# Patient Record
Sex: MALE | Race: WHITE | Employment: FULL TIME | URBAN - METROPOLITAN AREA
[De-identification: names, ages, dates, MRNs, and addresses within clinical notes are randomized per-mention and may not be internally consistent; named-entity substitution may affect disease eponyms.]

---

## 2017-03-03 ENCOUNTER — OFFICE VISIT (OUTPATIENT)
Dept: FAMILY MEDICINE CLINIC | Age: 42
End: 2017-03-03

## 2017-03-03 VITALS
HEART RATE: 106 BPM | BODY MASS INDEX: 35.54 KG/M2 | WEIGHT: 276.8 LBS | DIASTOLIC BLOOD PRESSURE: 80 MMHG | SYSTOLIC BLOOD PRESSURE: 140 MMHG | OXYGEN SATURATION: 95 % | TEMPERATURE: 96.3 F

## 2017-03-03 DIAGNOSIS — F17.200 TOBACCO USE DISORDER: ICD-10-CM

## 2017-03-03 DIAGNOSIS — E66.09 NON MORBID OBESITY DUE TO EXCESS CALORIES: Primary | ICD-10-CM

## 2017-03-03 PROCEDURE — 99213 OFFICE O/P EST LOW 20 MIN: CPT | Performed by: FAMILY MEDICINE

## 2017-03-03 RX ORDER — PHENTERMINE HYDROCHLORIDE 37.5 MG/1
37.5 CAPSULE ORAL EVERY MORNING
Qty: 30 CAPSULE | Refills: 0 | Status: SHIPPED | OUTPATIENT
Start: 2017-03-03 | End: 2017-04-02

## 2017-05-05 ENCOUNTER — OFFICE VISIT (OUTPATIENT)
Dept: FAMILY MEDICINE CLINIC | Age: 42
End: 2017-05-05

## 2017-05-05 VITALS
SYSTOLIC BLOOD PRESSURE: 138 MMHG | WEIGHT: 265 LBS | BODY MASS INDEX: 34.02 KG/M2 | TEMPERATURE: 97.3 F | DIASTOLIC BLOOD PRESSURE: 76 MMHG | HEART RATE: 108 BPM | OXYGEN SATURATION: 97 %

## 2017-05-05 DIAGNOSIS — E66.09 NON MORBID OBESITY DUE TO EXCESS CALORIES: Primary | ICD-10-CM

## 2017-05-05 PROCEDURE — 99213 OFFICE O/P EST LOW 20 MIN: CPT | Performed by: FAMILY MEDICINE

## 2017-05-05 RX ORDER — PHENTERMINE HYDROCHLORIDE 37.5 MG/1
CAPSULE ORAL
Refills: 0 | COMMUNITY
Start: 2017-04-18 | End: 2017-05-05 | Stop reason: SDUPTHER

## 2017-05-05 RX ORDER — PHENTERMINE HYDROCHLORIDE 37.5 MG/1
CAPSULE ORAL
Qty: 30 CAPSULE | Refills: 0 | Status: SHIPPED | OUTPATIENT
Start: 2017-05-05 | End: 2017-06-15 | Stop reason: SDUPTHER

## 2017-06-15 ENCOUNTER — OFFICE VISIT (OUTPATIENT)
Dept: FAMILY MEDICINE CLINIC | Age: 42
End: 2017-06-15

## 2017-06-15 VITALS
DIASTOLIC BLOOD PRESSURE: 78 MMHG | TEMPERATURE: 97.9 F | WEIGHT: 245.6 LBS | HEART RATE: 106 BPM | BODY MASS INDEX: 31.53 KG/M2 | SYSTOLIC BLOOD PRESSURE: 138 MMHG | OXYGEN SATURATION: 98 %

## 2017-06-15 DIAGNOSIS — E66.09 NON MORBID OBESITY DUE TO EXCESS CALORIES: Primary | ICD-10-CM

## 2017-06-15 DIAGNOSIS — F41.1 GENERALIZED ANXIETY DISORDER: ICD-10-CM

## 2017-06-15 PROCEDURE — 99214 OFFICE O/P EST MOD 30 MIN: CPT | Performed by: FAMILY MEDICINE

## 2017-06-15 RX ORDER — PHENTERMINE HYDROCHLORIDE 37.5 MG/1
CAPSULE ORAL
Qty: 30 CAPSULE | Refills: 0 | Status: SHIPPED | OUTPATIENT
Start: 2017-06-15 | End: 2017-12-17 | Stop reason: ALTCHOICE

## 2017-06-15 RX ORDER — LORAZEPAM 0.5 MG/1
0.5 TABLET ORAL EVERY 6 HOURS PRN
Qty: 30 TABLET | Refills: 0 | Status: SHIPPED | OUTPATIENT
Start: 2017-06-15 | End: 2017-06-22 | Stop reason: DRUGHIGH

## 2017-06-21 ENCOUNTER — TELEPHONE (OUTPATIENT)
Dept: FAMILY MEDICINE CLINIC | Age: 42
End: 2017-06-21

## 2017-06-22 DIAGNOSIS — E66.09 NON MORBID OBESITY DUE TO EXCESS CALORIES: ICD-10-CM

## 2017-06-22 DIAGNOSIS — F41.1 GENERALIZED ANXIETY DISORDER: ICD-10-CM

## 2017-06-22 RX ORDER — LORAZEPAM 1 MG/1
1 TABLET ORAL EVERY 6 HOURS PRN
Qty: 60 TABLET | Refills: 0 | Status: SHIPPED | OUTPATIENT
Start: 2017-06-22

## 2017-06-30 ENCOUNTER — TELEPHONE (OUTPATIENT)
Dept: FAMILY MEDICINE CLINIC | Age: 42
End: 2017-06-30

## 2019-05-13 ENCOUNTER — HOSPITAL ENCOUNTER (EMERGENCY)
Age: 44
Discharge: HOME | End: 2019-05-13
Attending: EMERGENCY MEDICINE

## 2019-05-13 ENCOUNTER — APPOINTMENT (OUTPATIENT)
Dept: GENERAL RADIOLOGY | Age: 44
End: 2019-05-13

## 2019-05-13 VITALS
DIASTOLIC BLOOD PRESSURE: 89 MMHG | RESPIRATION RATE: 20 BRPM | WEIGHT: 275 LBS | HEART RATE: 88 BPM | HEIGHT: 74 IN | OXYGEN SATURATION: 95 % | TEMPERATURE: 98 F | SYSTOLIC BLOOD PRESSURE: 153 MMHG | BODY MASS INDEX: 35.29 KG/M2

## 2019-05-13 DIAGNOSIS — R07.89 CHEST WALL PAIN: ICD-10-CM

## 2019-05-13 DIAGNOSIS — J40 BRONCHITIS: Primary | ICD-10-CM

## 2019-05-13 PROCEDURE — 99284 EMERGENCY DEPT VISIT MOD MDM: CPT

## 2019-05-13 PROCEDURE — 71046 X-RAY EXAM CHEST 2 VIEWS: CPT

## 2019-05-13 PROCEDURE — 6370000000 HC RX 637 (ALT 250 FOR IP): Performed by: EMERGENCY MEDICINE

## 2019-05-13 RX ORDER — OXYCODONE HYDROCHLORIDE AND ACETAMINOPHEN 5; 325 MG/1; MG/1
1 TABLET ORAL ONCE
Status: COMPLETED | OUTPATIENT
Start: 2019-05-13 | End: 2019-05-13

## 2019-05-13 RX ORDER — IBUPROFEN 600 MG/1
600 TABLET ORAL ONCE
Status: COMPLETED | OUTPATIENT
Start: 2019-05-13 | End: 2019-05-13

## 2019-05-13 RX ORDER — IBUPROFEN 800 MG/1
800 TABLET ORAL EVERY 6 HOURS PRN
COMMUNITY

## 2019-05-13 RX ORDER — OXYCODONE HYDROCHLORIDE AND ACETAMINOPHEN 5; 325 MG/1; MG/1
1 TABLET ORAL NIGHTLY PRN
Qty: 12 TABLET | Refills: 0 | Status: SHIPPED | OUTPATIENT
Start: 2019-05-13 | End: 2019-05-16

## 2019-05-13 RX ADMIN — IBUPROFEN 600 MG: 600 TABLET ORAL at 15:32

## 2019-05-13 RX ADMIN — OXYCODONE HYDROCHLORIDE AND ACETAMINOPHEN 1 TABLET: 5; 325 TABLET ORAL at 15:32

## 2019-05-13 ASSESSMENT — PAIN DESCRIPTION - ORIENTATION: ORIENTATION: LEFT

## 2019-05-13 ASSESSMENT — PAIN DESCRIPTION - DESCRIPTORS: DESCRIPTORS: POUNDING;SHARP

## 2019-05-13 ASSESSMENT — PAIN DESCRIPTION - PROGRESSION: CLINICAL_PROGRESSION: GRADUALLY WORSENING

## 2019-05-13 ASSESSMENT — PAIN DESCRIPTION - FREQUENCY: FREQUENCY: INTERMITTENT

## 2019-05-13 ASSESSMENT — PAIN SCALES - GENERAL
PAINLEVEL_OUTOF10: 4
PAINLEVEL_OUTOF10: 4

## 2019-05-13 ASSESSMENT — PAIN DESCRIPTION - PAIN TYPE: TYPE: ACUTE PAIN

## 2019-05-13 ASSESSMENT — PAIN DESCRIPTION - LOCATION: LOCATION: FLANK;RIB CAGE

## 2019-05-13 ASSESSMENT — ENCOUNTER SYMPTOMS: COUGH: 1

## 2019-05-13 ASSESSMENT — PAIN DESCRIPTION - ONSET: ONSET: GRADUAL

## 2019-05-13 NOTE — ED PROVIDER NOTES
Triage Chief Complaint:    Flank Pain; Wheezing; and Cough    [place]:  Sheldon Flynn is a 45 y.o. male that presents to the ED with his family complaining of pain in his low side of his chest.  He's had a cough and cold since last Wednesday. Hurts to breathe and move. He has no sense of shortness of breath he coughs up mucus everyday ; no hemoptysis or exudate. No fever chills no weight loss no hemoptysis.   No risk factors for DVT or PE    Past Medical History:   Diagnosis Date    Anxiety     Kidney stone      Past Surgical History:   Procedure Laterality Date    CHOLECYSTECTOMY      KIDNEY STONE SURGERY       Family History   Problem Relation Age of Onset    Cancer Father      Social History     Socioeconomic History    Marital status:      Spouse name: Not on file    Number of children: Not on file    Years of education: Not on file    Highest education level: Not on file   Occupational History    Occupation:     Occupation: construction   Social Needs    Financial resource strain: Not on file    Food insecurity:     Worry: Not on file     Inability: Not on file   [employer] needs:     Medical: Not on file     Non-medical: Not on file   Tobacco Use    Smoking status: Current Every Day Smoker     Packs/day: 1.00     Years: 25.00     Pack years: 25.00    Smokeless tobacco: Never Used   Substance and Sexual Activity    Alcohol use: Yes     Comment: seldom    Drug use: Yes     Frequency: 3.0 times per week     Types: Marijuana    Sexual activity: Yes     Partners: Female   Lifestyle    Physical activity:     Days per week: Not on file     Minutes per session: Not on file    Stress: Not on file   Relationships    Social connections:     Talks on phone: Not on file     Gets together: Not on file     Attends [employer]ism service: Not on file     Active member of club or organization: Not on file     Attends meetings of clubs or organizations: Not on file     Relationship status: Not on file    Intimate partner violence:     Fear of current or ex partner: Not on file     Emotionally abused: Not on file     Physically abused: Not on file     Forced sexual activity: Not on file   Other Topics Concern    Not on file   Social History Narrative    Not on file     No current facility-administered medications for this encounter. Current Outpatient Medications   Medication Sig Dispense Refill    ibuprofen (ADVIL;MOTRIN) 800 MG tablet Take 800 mg by mouth every 6 hours as needed for Pain      oxyCODONE-acetaminophen (PERCOCET) 5-325 MG per tablet Take 1 tablet by mouth nightly as needed for Pain for up to 3 days. Intended supply: 3 days. Take lowest dose possible to manage pain 12 tablet 0    methylPREDNISolone (MEDROL, EMMANUEL,) 4 MG tablet Take by mouth. 1 kit 0    LORazepam (ATIVAN) 1 MG tablet Take 1 tablet by mouth every 6 hours as needed for Anxiety 60 tablet 0     Allergies   Allergen Reactions    Codeine     Vicodin [Hydrocodone-Acetaminophen]      Severe constipation    Vicodin [Hydrocodone-Acetaminophen]          ROS:    Review of Systems   Respiratory: Positive for cough. All other systems reviewed and are negative. Nursing Notes Reviewed    Physical Exam:  ED Triage Vitals [05/13/19 1458]   Enc Vitals Group      BP (!) 153/89      Pulse 88      Resp 20      Temp 98 °F (36.7 °C)      Temp Source Oral      SpO2 95 %      Weight 275 lb (124.7 kg)      Height 6' 2\" (1.88 m)      Head Circumference       Peak Flow       Pain Score       Pain Loc       Pain Edu? Excl. in [address]? Physical Exam   Constitutional: He is oriented to person, place, and time. He appears well-developed and well-nourished. HENT:   Head: Normocephalic and atraumatic. Eyes: Pupils are equal, round, and reactive to light. Neck: Normal range of motion. Neck supple. Cardiovascular: Normal rate, regular rhythm, normal heart sounds and intact distal pulses.    Pulmonary/Chest: Effort normal. evaluation.           Clinical Impression:  1. Bronchitis    2. Chest wall pain      Disposition referral (if applicable):  Jose Benavides MD  [address]  [facility] [address] [phone]    Go in 1 week  If symptoms worsen    Disposition medications (if applicable):     New Prescriptions    OXYCODONE-ACETAMINOPHEN (PERCOCET) 5-325 MG PER TABLET    Take 1 tablet by mouth nightly as needed for Pain for up to 3 days. Intended supply: 3 days. Take lowest dose possible to manage pain           Darshan Armstrong DO, FACEP      Comment: Please note this report has been produced using speech recognition software and maycontain errors related to that system including errors in grammar, punctuation, and spelling, as well as words and phrases that may be inappropriate. If there are any questions or concerns please feel free to contact thedictating provider for clarification.         Lubna William DO  05/13/19 1179

## 2019-05-13 NOTE — ED NOTES
Discharge instructions and Rx given. Voices understanding. Ambulates without difficulty to private vehicle.       Tee Zapata RN  05/13/19 4918

## 2019-05-13 NOTE — LETTER
[facility] Emergency Department  [address] [address]  Phone: [phone]  Fax: [phone]               May 13, 2019    Patient: Mahamed Luciano   YOB: 1975   Date of Visit: 5/13/2019       To Whom It May Concern:    Alexia River was seen and treated in our emergency department on 5/13/2019. He may return to work 5/15/2019.       Sincerely,       Amparo He RN         Signature:__________________________________

## 2019-06-28 ENCOUNTER — OFFICE VISIT (OUTPATIENT)
Dept: FAMILY MEDICINE CLINIC | Age: 44
End: 2019-06-28

## 2019-06-28 VITALS
OXYGEN SATURATION: 98 % | DIASTOLIC BLOOD PRESSURE: 86 MMHG | HEART RATE: 99 BPM | TEMPERATURE: 96.8 F | WEIGHT: 255.8 LBS | BODY MASS INDEX: 32.84 KG/M2 | SYSTOLIC BLOOD PRESSURE: 148 MMHG

## 2019-06-28 DIAGNOSIS — E11.9 TYPE 2 DIABETES MELLITUS WITHOUT COMPLICATION, WITHOUT LONG-TERM CURRENT USE OF INSULIN (HCC): Primary | ICD-10-CM

## 2019-06-28 DIAGNOSIS — A63.0 GENITAL WARTS: ICD-10-CM

## 2019-06-28 DIAGNOSIS — H69.81 ETD (EUSTACHIAN TUBE DYSFUNCTION), RIGHT: ICD-10-CM

## 2019-06-28 DIAGNOSIS — E78.5 HYPERLIPIDEMIA ASSOCIATED WITH TYPE 2 DIABETES MELLITUS (HCC): ICD-10-CM

## 2019-06-28 DIAGNOSIS — E11.69 HYPERLIPIDEMIA ASSOCIATED WITH TYPE 2 DIABETES MELLITUS (HCC): ICD-10-CM

## 2019-06-28 DIAGNOSIS — A07.0 BALANTIDIASIS: ICD-10-CM

## 2019-06-28 DIAGNOSIS — I10 ESSENTIAL HYPERTENSION: ICD-10-CM

## 2019-06-28 PROBLEM — E78.1 HYPERTRIGLYCERIDEMIA: Status: ACTIVE | Noted: 2019-05-22

## 2019-06-28 PROBLEM — E66.9 OBESE: Status: ACTIVE | Noted: 2019-05-22

## 2019-06-28 PROCEDURE — 99214 OFFICE O/P EST MOD 30 MIN: CPT | Performed by: FAMILY MEDICINE

## 2019-06-28 RX ORDER — ATORVASTATIN CALCIUM 40 MG/1
40 TABLET, FILM COATED ORAL DAILY
Qty: 90 TABLET | Refills: 1 | Status: SHIPPED | OUTPATIENT
Start: 2019-06-28

## 2019-06-28 RX ORDER — BLOOD-GLUCOSE METER
1 KIT MISCELLANEOUS DAILY
Qty: 1 KIT | Refills: 0 | Status: SHIPPED | OUTPATIENT
Start: 2019-06-28

## 2019-06-28 RX ORDER — GLUCOSAMINE HCL/CHONDROITIN SU 500-400 MG
CAPSULE ORAL
Qty: 50 STRIP | Refills: 5 | Status: SHIPPED | OUTPATIENT
Start: 2019-06-28

## 2019-06-28 RX ORDER — LANCETS 30 GAUGE
1 EACH MISCELLANEOUS DAILY
Qty: 100 EACH | Refills: 0 | Status: SHIPPED | OUTPATIENT
Start: 2019-06-28

## 2019-06-28 RX ORDER — LISINOPRIL 20 MG/1
20 TABLET ORAL DAILY
Qty: 90 TABLET | Refills: 1 | Status: SHIPPED | OUTPATIENT
Start: 2019-06-28

## 2019-06-28 RX ORDER — FLUTICASONE PROPIONATE 50 MCG
2 SPRAY, SUSPENSION (ML) NASAL DAILY
Qty: 1 BOTTLE | Refills: 1 | Status: SHIPPED | OUTPATIENT
Start: 2019-06-28

## 2019-06-28 RX ORDER — PODOFILOX 5 MG/ML
SOLUTION TOPICAL
Qty: 1 BOTTLE | Refills: 1 | Status: SHIPPED | OUTPATIENT
Start: 2019-06-28 | End: 2019-07-08

## 2019-06-28 ASSESSMENT — PATIENT HEALTH QUESTIONNAIRE - PHQ9
2. FEELING DOWN, DEPRESSED OR HOPELESS: 0
SUM OF ALL RESPONSES TO PHQ QUESTIONS 1-9: 0
1. LITTLE INTEREST OR PLEASURE IN DOING THINGS: 0
SUM OF ALL RESPONSES TO PHQ QUESTIONS 1-9: 0
SUM OF ALL RESPONSES TO PHQ9 QUESTIONS 1 & 2: 0

## 2019-06-28 NOTE — PROGRESS NOTES
Ana Raymond is a 45 y.o. male who presents for evaluation of hypertension, hyperlipidemia, and newly diagnosed diabetes. He was admitted to [facility] for hypertensive Urgency and on routine labs, his A1c was 8.5% He states that he wasn't told that he had diabetes while hospitalized. Natty Spangler He indicates that he is feeling well and denies any symptoms referable to his elevated blood pressure. Specifically denies chest pain, palpitations, dyspnea, orthopnea, PND or peripheral edema. No anorexia, arthralgia, or leg cramps noted. Current medication regimen is as listed below. He denies any side effects of medication, and has been taking it regularly. medication compliance:  compliant all of the time, diabetic diet compliance:  not following a diet,  further diabetic ROS: no chest pain, dyspnea or TIAs, no numbness, tingling or pain in extremities, has noted excessive thirstiness and frequent urination, blurry vision. Patient complaining of right ear pressure. Patient had sore throat for a few days and now has right ear pressure. The sore throat has resolved. Denies any fevers or chills. No cough or shortness of breath. Patient has developed a yeast infection on the end of his penis causing swelling. He is been treated with Chlortrimazole which seems to be helping. Patient has had a return of his genital wart. He wants to get back on the Condylox. ROS: No TIA's or unusual headaches, no dysphagia. No prolonged cough. No dyspnea or chest pain on exertion. No abdominal pain, change in bowel habits, black or bloody stools. No urinary tract or BPH symptoms. No new or unusual musculoskeletal symptoms. Current Outpatient Medications   Medication Sig Dispense Refill    ibuprofen (ADVIL;MOTRIN) 800 MG tablet Take 800 mg by mouth every 6 hours as needed for Pain      methylPREDNISolone (MEDROL, EMMANUEL,) 4 MG tablet Take by mouth.  1 kit 0    LORazepam (ATIVAN) 1 MG tablet Take 1 tablet by mouth every 6 hours as needed for Anxiety 60 tablet 0     No current facility-administered medications for this visit. Allergies   Allergen Reactions    Codeine     Vicodin [Hydrocodone-Acetaminophen]      Severe constipation    Vicodin [Hydrocodone-Acetaminophen]        Social History     Tobacco Use    Smoking status: Current Every Day Smoker     Packs/day: 1.00     Years: 25.00     Pack years: 25.00    Smokeless tobacco: Never Used   Substance Use Topics    Alcohol use: Yes     Comment: seldom          Objective:      BP (!) 148/86 (Site: Left Upper Arm, Position: Sitting, Cuff Size: Large Adult)   Pulse 99   Temp 96.8 °F (36 °C) (Temporal)   Wt 255 lb 12.8 oz (116 kg)   SpO2 98%   BMI 32.84 kg/m²   General: Alert and oriented, in no distress   HEENT: Atraumatic, normocephalic, pupils equal round regular reactive to light and accommodation, oropharynx with cobblestoning. Nasal mucosa normal.  Right TM retracted. S1 and S2 normal, no murmurs, clicks, gallops or rubs. Regular rate and rhythm. Chest is clear; no wheezes or rales. No edema or JVD. heart sounds regular rate and rhythm, S1, S2 normal, no murmur, click, rub or gallop, chest clear, no hepatosplenomegaly, no carotid bruits, feet: normal DP and PT pulses, no trophic changes or ulcerative lesions and normal monofilament exam     Assessment:      Essential hypertension - needs improvement  Hyperlipidemia - asymptomatic  Diabetes--new onset   Eustachian tube dysfunction on right  Balantidiasis  Genital warts     Plan: Will start metformin, lisinopril, and Lipitor. Send to diabetic education  Flonase for eustachian tube dysfunction  Continue Chlortrimazole  1)  Medication: continue current medication regimen unchanged  2)  Recheck in 3 months, sooner should new symptoms or problems arise.      Iam Ferguson received counseling on the following healthy behaviors: nutrition, exercise and medication adherence    Patient given educational materials on Diabetes    I have instructed Arthur Leal to complete a self tracking handout on Blood Sugars  and instructed them to bring it with them to his next appointment. Discussed use, benefit, and side effects of prescribed medications. Barriers to medication compliance addressed. All patient questions answered. Pt voiced understanding.

## 2019-06-28 NOTE — PATIENT INSTRUCTIONS
Patient Education        Learning About Diabetes Food Guidelines  Your Care Instructions    Meal planning is important to manage diabetes. It helps keep your blood sugar at a target level (which you set with your doctor). You don't have to eat special foods. You can eat what your family eats, including sweets once in a while. But you do have to pay attention to how often you eat and how much you eat of certain foods. You may want to work with a dietitian or a certified diabetes educator (CDE) to help you plan meals and snacks. A dietitian or CDE can also help you lose weight if that is one of your goals. What should you know about eating carbs? Managing the amount of carbohydrate (carbs) you eat is an important part of healthy meals when you have diabetes. Carbohydrate is found in many foods. · Learn which foods have carbs. And learn the amounts of carbs in different foods. ? Bread, cereal, pasta, and rice have about 15 grams of carbs in a serving. A serving is 1 slice of bread (1 ounce), ½ cup of cooked cereal, or 1/3 cup of cooked pasta or rice. ? Fruits have 15 grams of carbs in a serving. A serving is 1 small fresh fruit, such as an apple or orange; ½ of a banana; ½ cup of cooked or canned fruit; ½ cup of fruit juice; 1 cup of melon or raspberries; or 2 tablespoons of dried fruit. ? Milk and no-sugar-added yogurt have 15 grams of carbs in a serving. A serving is 1 cup of milk or 2/3 cup of no-sugar-added yogurt. ? Starchy vegetables have 15 grams of carbs in a serving. A serving is ½ cup of mashed potatoes or sweet potato; 1 cup winter squash; ½ of a small baked potato; ½ cup of cooked beans; or ½ cup cooked corn or green peas. · Learn how much carbs to eat each day and at each meal. A dietitian or CDE can teach you how to keep track of the amount of carbs you eat. This is called carbohydrate counting. · If you are not sure how to count carbohydrate grams, use the Plate Method to plan meals.  It is a good, quick way to make sure that you have a balanced meal. It also helps you spread carbs throughout the day. ? Divide your plate by types of foods. Put non-starchy vegetables on half the plate, meat or other protein food on one-quarter of the plate, and a grain or starchy vegetable in the final quarter of the plate. To this you can add a small piece of fruit and 1 cup of milk or yogurt, depending on how many carbs you are supposed to eat at a meal.  · Try to eat about the same amount of carbs at each meal. Do not \"save up\" your daily allowance of carbs to eat at one meal.  · Proteins have very little or no carbs per serving. Examples of proteins are beef, chicken, turkey, fish, eggs, tofu, cheese, cottage cheese, and peanut butter. A serving size of meat is 3 ounces, which is about the size of a deck of cards. Examples of meat substitute serving sizes (equal to 1 ounce of meat) are 1/4 cup of cottage cheese, 1 egg, 1 tablespoon of peanut butter, and ½ cup of tofu. How can you eat out and still eat healthy? · Learn to estimate the serving sizes of foods that have carbohydrate. If you measure food at home, it will be easier to estimate the amount in a serving of restaurant food. · If the meal you order has too much carbohydrate (such as potatoes, corn, or baked beans), ask to have a low-carbohydrate food instead. Ask for a salad or green vegetables. · If you use insulin, check your blood sugar before and after eating out to help you plan how much to eat in the future. · If you eat more carbohydrate at a meal than you had planned, take a walk or do other exercise. This will help lower your blood sugar. What else should you know? · Limit saturated fat, such as the fat from meat and dairy products. This is a healthy choice because people who have diabetes are at higher risk of heart disease. So choose lean cuts of meat and nonfat or low-fat dairy products.  Use olive or canola oil instead of butter or shortening when cooking. · Don't skip meals. Your blood sugar may drop too low if you skip meals and take insulin or certain medicines for diabetes. · Check with your doctor before you drink alcohol. Alcohol can cause your blood sugar to drop too low. Alcohol can also cause a bad reaction if you take certain diabetes medicines. Follow-up care is a key part of your treatment and safety. Be sure to make and go to all appointments, and call your doctor if you are having problems. It's also a good idea to know your test results and keep a list of the medicines you take. Where can you learn more? Go to https://[employer] Innovationpepiceweb.[employer]. org and sign in to your [employer] account. Enter S485 in the [employer] box to learn more about \"Learning About Diabetes Food Guidelines. \"     If you do not have an account, please click on the \"Sign Up Now\" link. Current as of: July 25, 2018  Content Version: 12.0  © 9697-3907 Healthwise, Incorporated. Care instructions adapted under license by [facility] ([facility]). If you have questions about a medical condition or this instruction, always ask your healthcare professional. [place] [address] any warranty or liability for your use of this information.

## 2019-09-27 ENCOUNTER — TELEPHONE (OUTPATIENT)
Dept: FAMILY MEDICINE CLINIC | Age: 44
End: 2019-09-27

## 2019-09-27 ENCOUNTER — OFFICE VISIT (OUTPATIENT)
Dept: FAMILY MEDICINE CLINIC | Age: 44
End: 2019-09-27

## 2019-09-27 VITALS
BODY MASS INDEX: 32.53 KG/M2 | HEART RATE: 76 BPM | OXYGEN SATURATION: 96 % | TEMPERATURE: 96 F | SYSTOLIC BLOOD PRESSURE: 124 MMHG | WEIGHT: 253.4 LBS | DIASTOLIC BLOOD PRESSURE: 80 MMHG

## 2019-09-27 DIAGNOSIS — E78.5 HYPERLIPIDEMIA ASSOCIATED WITH TYPE 2 DIABETES MELLITUS (HCC): ICD-10-CM

## 2019-09-27 DIAGNOSIS — E11.69 HYPERLIPIDEMIA ASSOCIATED WITH TYPE 2 DIABETES MELLITUS (HCC): ICD-10-CM

## 2019-09-27 DIAGNOSIS — S46.812A STRAIN OF LEFT TRAPEZIUS MUSCLE, INITIAL ENCOUNTER: ICD-10-CM

## 2019-09-27 DIAGNOSIS — F17.200 TOBACCO USE DISORDER: ICD-10-CM

## 2019-09-27 DIAGNOSIS — E11.9 TYPE 2 DIABETES MELLITUS WITHOUT COMPLICATION, WITHOUT LONG-TERM CURRENT USE OF INSULIN (HCC): Primary | ICD-10-CM

## 2019-09-27 DIAGNOSIS — I10 ESSENTIAL HYPERTENSION: ICD-10-CM

## 2019-09-27 LAB — HBA1C MFR BLD: 9 %

## 2019-09-27 PROCEDURE — 83036 HEMOGLOBIN GLYCOSYLATED A1C: CPT | Performed by: FAMILY MEDICINE

## 2019-09-27 PROCEDURE — 90732 PPSV23 VACC 2 YRS+ SUBQ/IM: CPT | Performed by: FAMILY MEDICINE

## 2019-09-27 PROCEDURE — 90471 IMMUNIZATION ADMIN: CPT | Performed by: FAMILY MEDICINE

## 2019-09-27 PROCEDURE — 99214 OFFICE O/P EST MOD 30 MIN: CPT | Performed by: FAMILY MEDICINE

## 2019-09-27 PROCEDURE — 36415 COLL VENOUS BLD VENIPUNCTURE: CPT | Performed by: FAMILY MEDICINE

## 2019-09-27 RX ORDER — MELOXICAM 15 MG/1
15 TABLET ORAL DAILY
Qty: 90 TABLET | Refills: 0 | Status: SHIPPED | OUTPATIENT
Start: 2019-09-27

## 2019-09-27 RX ORDER — PIOGLITAZONEHYDROCHLORIDE 30 MG/1
30 TABLET ORAL DAILY
Qty: 30 TABLET | Refills: 2 | Status: SHIPPED | OUTPATIENT
Start: 2019-09-27 | End: 2019-09-30 | Stop reason: SDUPTHER

## 2019-09-27 ASSESSMENT — PATIENT HEALTH QUESTIONNAIRE - PHQ9
1. LITTLE INTEREST OR PLEASURE IN DOING THINGS: 0
SUM OF ALL RESPONSES TO PHQ9 QUESTIONS 1 & 2: 0
2. FEELING DOWN, DEPRESSED OR HOPELESS: 0
SUM OF ALL RESPONSES TO PHQ QUESTIONS 1-9: 0
SUM OF ALL RESPONSES TO PHQ QUESTIONS 1-9: 0

## 2019-09-27 NOTE — PATIENT INSTRUCTIONS
Patient Education        Type 2 Diabetes: Care Instructions  Your Care Instructions    Type 2 diabetes is a disease that develops when the body's tissues cannot use insulin properly. Over time, the pancreas cannot make enough insulin. Insulin is a hormone that helps the body's cells use sugar (glucose) for energy. It also helps the body store extra sugar in muscle, fat, and liver cells. Without insulin, the sugar cannot get into the cells to do its work. It stays in the blood instead. This can cause high blood sugar levels. A person has diabetes when the blood sugar stays too high too much of the time. Over time, diabetes can lead to diseases of the heart, blood vessels, nerves, kidneys, and eyes. You may be able to control your blood sugar by losing weight, eating a healthy diet, and getting daily exercise. You may also have to take insulin or other diabetes medicine. Follow-up care is a key part of your treatment and safety. Be sure to make and go to all appointments. Call your doctor if you are having problems. It's also a good idea to know your test results and keep a list of the medicines you take. How can you care for yourself at home? · Keep your blood sugar at a target level (which you set with your doctor). ? Eat a good diet that spreads carbohydrate throughout the day. Carbohydrate--the body's main source of fuel--affects blood sugar more than any other nutrient. Carbohydrate is in fruits, vegetables, milk, and yogurt. It also is in breads, cereals, vegetables such as potatoes and corn, and sugary foods such as candy and cakes. ? Aim for 30 minutes of exercise on most, preferably all, days of the week. Walking is a good choice. You also may want to do other activities, such as running, swimming, cycling, or playing tennis or team sports. If your doctor says it's okay, do muscle-strengthening exercises at least 2 times a week. ? Take your medicines exactly as prescribed.  Call your doctor if you think you are having a problem with your medicine. You will get more details on the specific medicines your doctor prescribes. · Check your blood sugar as often as your doctor recommends. It is important to keep track of any symptoms you have, such as low blood sugar. Also tell your doctor if you have any changes in your activities, diet, or insulin use. · Talk to your doctor before you start taking aspirin every day. Aspirin can help certain people lower their risk of a heart attack or stroke. But taking aspirin isn't right for everyone, because it can cause serious bleeding. · Do not smoke. If you need help quitting, talk to your doctor about stop-smoking programs and medicines. These can increase your chances of quitting for good. · Keep your cholesterol and blood pressure at normal levels. You may need to take one or more medicines to reach your goals. Take them exactly as directed. Do not stop or change a medicine without talking to your doctor first.  When should you call for help? Call 911 anytime you think you may need emergency care. For example, call if:    · You passed out (lost consciousness), or you suddenly become very sleepy or confused. (You may have very low blood sugar.)    Call your doctor now or seek immediate medical care if:    · Your blood sugar is 300 mg/dL or is higher than the level your doctor has set for you.     · You have symptoms of low blood sugar, such as:  ? Sweating. ? Feeling nervous, shaky, and weak. ? Extreme hunger and slight nausea. ? Dizziness and headache.  ? Blurred vision. ? Confusion.    Watch closely for changes in your health, and be sure to contact your doctor if:    · You often have problems controlling your blood sugar.     · You have symptoms of long-term diabetes problems, such as:  ? New vision changes. ? New pain, numbness, or tingling in your hands or feet. ? Skin problems. Where can you learn more? Go to https://chhermeseb.[employer].io. org and

## 2019-09-27 NOTE — PROGRESS NOTES
Janet Rosales is a 45 y.o. male who presents for evaluation of hypertension, hyperlipidemia, and diabetes. Lavada Saucer He indicates that he is feeling well and denies any symptoms referable to his elevated blood pressure. Specifically denies chest pain, palpitations, dyspnea, orthopnea, PND or peripheral edema. No anorexia, arthralgia, or leg cramps noted. Current medication regimen is as listed below. He denies any side effects of medication, and has been taking it regularly. medication compliance:  compliant most of the time, diabetic diet compliance:  compliant most of the time, home glucose monitoring: are performed regularly, values range 160-200, further diabetic ROS: no polyuria or polydipsia, no chest pain, dyspnea or TIAs, no numbness, tingling or pain in extremities, last eye exam approximately over 1 year ago. Shoulder Pain: Patient complaints of left shoulder pain. This is evaluated as a personal injury. The pain is described as aching. The onset of the pain was about 1 year. The pain occurs continuously  Location is trapezius. No history of dislocation. Symptoms are aggravated by reaching, lifting, pulling, pushing, carrying, throwing, twisting, driving long distances. Symptoms are diminished by  rest.   Limited activities include: no limitations. No stiffness, no weakness, no swelling, no crepitus noted is reported. 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 Recurrent left thumb pain for years. Changes with weather. ROS: No TIA's or unusual headaches, no dysphagia. No prolonged cough. No dyspnea or chest pain on exertion. No abdominal pain, change in bowel habits, black or bloody stools. No urinary tract or BPH symptoms. No new or unusual musculoskeletal symptoms.     Current Outpatient Medications   Medication Sig Dispense Refill    fluticasone (FLONASE) 50 MCG/ACT nasal spray 2 sprays by Nasal route daily 1 Bottle 1    metFORMIN (GLUCOPHAGE) 1000 MG tablet Take 1 tablet by mouth 2 times daily (with meals) 180 tablet 1    lisinopril (PRINIVIL;ZESTRIL) 20 MG tablet Take 1 tablet by mouth daily 90 tablet 1    atorvastatin (LIPITOR) 40 MG tablet Take 1 tablet by mouth daily 90 tablet 1    glucose monitoring kit (FREESTYLE) monitoring kit 1 kit by Does not apply route daily 1 kit 0    blood glucose monitor strips Test daily 50 strip 5    Lancets MISC 1 each by Does not apply route daily 100 each 0    ibuprofen (ADVIL;MOTRIN) 800 MG tablet Take 800 mg by mouth every 6 hours as needed for Pain      methylPREDNISolone (MEDROL, EMMANUEL,) 4 MG tablet Take by mouth. 1 kit 0    LORazepam (ATIVAN) 1 MG tablet Take 1 tablet by mouth every 6 hours as needed for Anxiety 60 tablet 0     No current facility-administered medications for this visit. Allergies   Allergen Reactions    Codeine     Vicodin [Hydrocodone-Acetaminophen]      Severe constipation    Vicodin [Hydrocodone-Acetaminophen]        Social History     Tobacco Use    Smoking status: Current Every Day Smoker     Packs/day: 1.00     Years: 25.00     Pack years: 25.00    Smokeless tobacco: Never Used   Substance Use Topics    Alcohol use: Yes     Comment: seldom          Objective:      /80 (Site: Left Upper Arm, Position: Sitting, Cuff Size: Large Adult)   Pulse 76   Temp 96 °F (35.6 °C) (Temporal)   Wt 253 lb 6.4 oz (114.9 kg)   SpO2 96%   BMI 32.53 kg/m²   General: Alert and oriented, in no distress   S1 and S2 normal, no murmurs, clicks, gallops or rubs. Regular rate and rhythm. Chest is clear; no wheezes or rales. No edema or JVD. heart sounds regular rate and rhythm, S1, S2 normal, no murmur, click, rub or gallop, chest clear, no hepatosplenomegaly, no carotid bruits, feet: normal DP and PT pulses, no trophic changes or ulcerative lesions and normal monofilament exam  Left shoulder with tenderness of trapezius.   Full range of motion  A1c 9.0%     Assessment:      Essential hypertension - well controlled and stable  Hyperlipidemia - asymptomatic  Diabetes--asymptomatic and needs improvement   Trapezius strain  Back her use     Plan:      Add [place] for improved diabetes control. Add meloxicam 15 mg daily for shoulder strain    1)  Medication: continue current medication regimen unchanged  2)  Recheck in 3 months, sooner should new symptoms or problems arise. Kelsey Cuevas received counseling on the following healthy behaviors: nutrition, exercise and medication adherence and smoking cessation    Patient given educational materials on Diabetes    I have instructed Kelsey Aceas to complete a self tracking handout on Blood Sugars  and instructed them to bring it with them to his next appointment. Discussed use, benefit, and side effects of prescribed medications. Barriers to medication compliance addressed. All patient questions answered. Pt voiced understanding.

## 2019-09-28 LAB
A/G RATIO: 2.2 (CALC) (ref 0.8–2.6)
ALBUMIN SERPL-MCNC: 4.8 GM/DL (ref 3.5–5.2)
ALP BLD-CCNC: 94 U/L (ref 23–144)
ALT SERPL-CCNC: 25 U/L (ref 0–60)
AST SERPL-CCNC: 18 U/L (ref 0–46)
BILIRUB SERPL-MCNC: 0.4 MG/DL (ref 0–1.2)
BUN / CREAT RATIO: 12 (CALC) (ref 7–25)
BUN BLDV-MCNC: 13 MG/DL (ref 3–29)
CALCIUM SERPL-MCNC: 9.8 MG/DL (ref 8.5–10.5)
CHLORIDE BLD-SCNC: 100 MEQ/L (ref 96–110)
CHOLESTEROL, TOTAL: 126 MG/DL
CO2: 20 MEQ/L (ref 19–32)
CREAT SERPL-MCNC: 1.1 MG/DL
CREAT SERPL-MCNC: 117 MG/DL
GDT REPLACEMENT: NORMAL
GFR SERPL CREATININE-BSD FRML MDRD: 82 ML/MIN/1.73M2
GLOBULIN: 2.2 GM/DL (CALC) (ref 1.9–3.6)
GLUCOSE BLD-MCNC: 152 MG/DL
HDLC SERPL-MCNC: 40 MG/DL
LDL CHOLESTEROL: 40 MG/DL (CALC)
MICROALBUMIN/CREAT 24H UR: 1760 MCG/DL
MICROALBUMIN/CREAT UR-RTO: 15 MCG/MG CREAT (ref 0–30)
POTASSIUM SERPL-SCNC: 4.8 MEQ/L (ref 3.4–5.3)
SODIUM BLD-SCNC: 138 MEQ/L (ref 135–148)
TOTAL PROTEIN: 7 GM/DL (ref 6–8.3)
TRIGL SERPL-MCNC: 231 MG/DL
VLDLC SERPL CALC-MCNC: 46 MG/DL (CALC) (ref 4–32)

## 2019-09-30 RX ORDER — PIOGLITAZONEHYDROCHLORIDE 30 MG/1
30 TABLET ORAL DAILY
Qty: 30 TABLET | Refills: 2 | Status: SHIPPED | OUTPATIENT
Start: 2019-09-30